# Patient Record
Sex: MALE | Race: BLACK OR AFRICAN AMERICAN | NOT HISPANIC OR LATINO | Employment: STUDENT | URBAN - METROPOLITAN AREA
[De-identification: names, ages, dates, MRNs, and addresses within clinical notes are randomized per-mention and may not be internally consistent; named-entity substitution may affect disease eponyms.]

---

## 2024-02-26 ENCOUNTER — OFFICE VISIT (OUTPATIENT)
Dept: PRIMARY CARE | Facility: HOSPITAL | Age: 34
End: 2024-02-26
Payer: COMMERCIAL

## 2024-02-26 ENCOUNTER — PHARMACY VISIT (OUTPATIENT)
Dept: PHARMACY | Facility: CLINIC | Age: 34
End: 2024-02-26

## 2024-02-26 VITALS
HEART RATE: 87 BPM | TEMPERATURE: 98.2 F | OXYGEN SATURATION: 99 % | DIASTOLIC BLOOD PRESSURE: 68 MMHG | BODY MASS INDEX: 23.3 KG/M2 | SYSTOLIC BLOOD PRESSURE: 111 MMHG | WEIGHT: 131.5 LBS | HEIGHT: 63 IN

## 2024-02-26 DIAGNOSIS — G89.29 CHRONIC MIDLINE THORACIC BACK PAIN: ICD-10-CM

## 2024-02-26 DIAGNOSIS — M54.6 CHRONIC MIDLINE THORACIC BACK PAIN: ICD-10-CM

## 2024-02-26 DIAGNOSIS — Z72.51 RISK FOR SEXUALLY TRANSMITTED DISEASE: ICD-10-CM

## 2024-02-26 DIAGNOSIS — Z00.00 HEALTH MAINTENANCE EXAMINATION: Primary | ICD-10-CM

## 2024-02-26 DIAGNOSIS — R07.9 CHEST PAIN WITH LOW RISK FOR CARDIAC ETIOLOGY: ICD-10-CM

## 2024-02-26 PROBLEM — Z11.3 ROUTINE SCREENING FOR STI (SEXUALLY TRANSMITTED INFECTION): Status: ACTIVE | Noted: 2024-02-26

## 2024-02-26 LAB
ALBUMIN SERPL BCP-MCNC: 4.2 G/DL (ref 3.4–5)
ALP SERPL-CCNC: 42 U/L (ref 33–120)
ALT SERPL W P-5'-P-CCNC: 11 U/L (ref 10–52)
ANION GAP SERPL CALC-SCNC: 11 MMOL/L (ref 10–20)
AST SERPL W P-5'-P-CCNC: 15 U/L (ref 9–39)
BASOPHILS # BLD AUTO: 0.01 X10*3/UL (ref 0–0.1)
BASOPHILS NFR BLD AUTO: 0.2 %
BILIRUB SERPL-MCNC: 0.5 MG/DL (ref 0–1.2)
BUN SERPL-MCNC: 10 MG/DL (ref 6–23)
CALCIUM SERPL-MCNC: 9.4 MG/DL (ref 8.6–10.6)
CHLORIDE SERPL-SCNC: 108 MMOL/L (ref 98–107)
CHOLEST SERPL-MCNC: 170 MG/DL (ref 0–199)
CHOLESTEROL/HDL RATIO: 3.7
CO2 SERPL-SCNC: 29 MMOL/L (ref 21–32)
CREAT SERPL-MCNC: 0.95 MG/DL (ref 0.5–1.3)
EGFRCR SERPLBLD CKD-EPI 2021: >90 ML/MIN/1.73M*2
EOSINOPHIL # BLD AUTO: 0.06 X10*3/UL (ref 0–0.7)
EOSINOPHIL NFR BLD AUTO: 1.5 %
ERYTHROCYTE [DISTWIDTH] IN BLOOD BY AUTOMATED COUNT: 11.9 % (ref 11.5–14.5)
ERYTHROCYTE [SEDIMENTATION RATE] IN BLOOD BY WESTERGREN METHOD: 4 MM/H (ref 0–15)
EST. AVERAGE GLUCOSE BLD GHB EST-MCNC: 74 MG/DL
GLUCOSE SERPL-MCNC: 111 MG/DL (ref 74–99)
HBA1C MFR BLD: 4.2 %
HCT VFR BLD AUTO: 43 % (ref 41–52)
HCV AB SER QL: NONREACTIVE
HDLC SERPL-MCNC: 46.3 MG/DL
HGB BLD-MCNC: 13.9 G/DL (ref 13.5–17.5)
HIV 1+2 AB+HIV1 P24 AG SERPL QL IA: NONREACTIVE
IMM GRANULOCYTES # BLD AUTO: 0.01 X10*3/UL (ref 0–0.7)
IMM GRANULOCYTES NFR BLD AUTO: 0.2 % (ref 0–0.9)
LYMPHOCYTES # BLD AUTO: 1.95 X10*3/UL (ref 1.2–4.8)
LYMPHOCYTES NFR BLD AUTO: 47.7 %
MCH RBC QN AUTO: 29.4 PG (ref 26–34)
MCHC RBC AUTO-ENTMCNC: 32.3 G/DL (ref 32–36)
MCV RBC AUTO: 91 FL (ref 80–100)
MONOCYTES # BLD AUTO: 0.5 X10*3/UL (ref 0.1–1)
MONOCYTES NFR BLD AUTO: 12.2 %
NEUTROPHILS # BLD AUTO: 1.56 X10*3/UL (ref 1.2–7.7)
NEUTROPHILS NFR BLD AUTO: 38.2 %
NON-HDL CHOLESTEROL: 124 MG/DL (ref 0–149)
NRBC BLD-RTO: 0 /100 WBCS (ref 0–0)
PLATELET # BLD AUTO: 200 X10*3/UL (ref 150–450)
POTASSIUM SERPL-SCNC: 3.8 MMOL/L (ref 3.5–5.3)
PROT SERPL-MCNC: 6.6 G/DL (ref 6.4–8.2)
RBC # BLD AUTO: 4.73 X10*6/UL (ref 4.5–5.9)
SODIUM SERPL-SCNC: 144 MMOL/L (ref 136–145)
TREPONEMA PALLIDUM IGG+IGM AB [PRESENCE] IN SERUM OR PLASMA BY IMMUNOASSAY: NONREACTIVE
WBC # BLD AUTO: 4.1 X10*3/UL (ref 4.4–11.3)

## 2024-02-26 PROCEDURE — 1036F TOBACCO NON-USER: CPT

## 2024-02-26 PROCEDURE — 99213 OFFICE O/P EST LOW 20 MIN: CPT | Mod: GC

## 2024-02-26 PROCEDURE — 86780 TREPONEMA PALLIDUM: CPT

## 2024-02-26 PROCEDURE — 87389 HIV-1 AG W/HIV-1&-2 AB AG IA: CPT

## 2024-02-26 PROCEDURE — 85025 COMPLETE CBC W/AUTO DIFF WBC: CPT

## 2024-02-26 PROCEDURE — 86803 HEPATITIS C AB TEST: CPT

## 2024-02-26 PROCEDURE — 83036 HEMOGLOBIN GLYCOSYLATED A1C: CPT

## 2024-02-26 PROCEDURE — 83718 ASSAY OF LIPOPROTEIN: CPT

## 2024-02-26 PROCEDURE — 99203 OFFICE O/P NEW LOW 30 MIN: CPT

## 2024-02-26 PROCEDURE — 36415 COLL VENOUS BLD VENIPUNCTURE: CPT

## 2024-02-26 PROCEDURE — 84075 ASSAY ALKALINE PHOSPHATASE: CPT

## 2024-02-26 PROCEDURE — 87800 DETECT AGNT MULT DNA DIREC: CPT

## 2024-02-26 PROCEDURE — 85652 RBC SED RATE AUTOMATED: CPT

## 2024-02-26 PROCEDURE — RXMED WILLOW AMBULATORY MEDICATION CHARGE

## 2024-02-26 PROCEDURE — 87661 TRICHOMONAS VAGINALIS AMPLIF: CPT | Mod: 59

## 2024-02-26 RX ORDER — ACETAMINOPHEN 325 MG/1
650 TABLET ORAL EVERY 6 HOURS PRN
Qty: 60 TABLET | Refills: 0 | Status: SHIPPED | OUTPATIENT
Start: 2024-02-26 | End: 2024-03-11

## 2024-02-26 RX ORDER — IBUPROFEN 600 MG/1
600 TABLET ORAL 4 TIMES DAILY PRN
Qty: 40 TABLET | Refills: 0 | Status: SHIPPED | OUTPATIENT
Start: 2024-02-26 | End: 2024-03-07

## 2024-02-26 ASSESSMENT — ENCOUNTER SYMPTOMS
DEPRESSION: 0
LOSS OF SENSATION IN FEET: 0
OCCASIONAL FEELINGS OF UNSTEADINESS: 0

## 2024-02-26 ASSESSMENT — PATIENT HEALTH QUESTIONNAIRE - PHQ9
2. FEELING DOWN, DEPRESSED OR HOPELESS: SEVERAL DAYS
SUM OF ALL RESPONSES TO PHQ9 QUESTIONS 1 AND 2: 2
1. LITTLE INTEREST OR PLEASURE IN DOING THINGS: SEVERAL DAYS

## 2024-02-26 ASSESSMENT — PAIN SCALES - GENERAL: PAINLEVEL: 4

## 2024-02-26 NOTE — PROGRESS NOTES
HPI     Lewis Hernandez is 33 y.o. a male with no PMH who is presenting as new patient for lower back and chest pain.    Per the patient: Has been having chest pain and lower back pain for the past 6 months. Has sharp pain in the left lower chest that comes and goes. For the back will have pain associated with movement, bending forward and backward.     Regarding chest pain, denies it currently. Hasn't had in a few days. Notices it when sitting and studying, has never occurred while exercising. Characterizes as sharp pain, 5-6 in severity and lasts 1-2 minutes. Usually doesn't happen more than once per day. Pain improves when he sits up or stretches.     Regarding back pain, has been ongoing for 6 months, currently 5/10, sharp, sometimes doesn't really feel until he moves. Notices it is worst in the morning after getting out of bed, then eases up throughout the day. Doesn't radiate anywhere. Also denies numbness/tingling. Hasn't tried any medicine to make it better recently. Previously went to hospital in [facility] when pain was very intense, received tramadol which helped. Took the medicine and didn't have pain for about a week, this was about a year ago. The pain let up after that but is now back again.    ROS:  GENERAL.: Denies weight change, N/V/F/C, trouble sleeping.  EYES: Denies vision lose, double vision, blurry vision.   ENT: Denies sore throat, runny nose, congestion, trouble swallowing.  CARDIO: Denies chest pain, palpitations, orthopnea, PND.  PULMONARY: Denies shortness of breath, cough.  GI: Denies abdominal pain, constipation, diarrhea, bloody or black stools.  : Denies frequency, urgency, dysuria, hematuria.  MS: Denies limb pain, joint pain.  SKIN: Denies rashes.  PSYCH: Denies change in mood.  Review of systems is otherwise negative unless stated above or in history of present illness    Meds:  Not taking any meds, no vitamins or supplements.    Allergies: None  Preferred pharmacy: [facility]  "refills? No    PMH: Denies  PSH: None  FH: Mom with HTN.  Social:  Living situation: Lives in a house, lives with 3 room mates. 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 Originally from [place], has been in states since [place] 2023.  Alcohol: Denies  Tobacco: Denies  Other drugs: Denies  Sexually active?: Not currently, previously with women. Would be interested in testing.    Health Maintenance Due   Topic Date Due    Yearly Adult Physical  Never done    Hepatitis B Vaccines (1 of 3 - 3-dose series) Never done    Lipid Panel  Never done    HIV Screening  Never done    COVID-19 Vaccine (1) Never done    MMR Vaccines (1 of 1 - Standard series) Never done    Varicella Vaccines (1 of 2 - 2-dose childhood series) Never done    Hepatitis C Screening  Never done    DTaP/Tdap/Td Vaccines (1 - Tdap) Never done    Influenza Vaccine (1) Never done        Tobacco Use: Low Risk  (2/26/2024)    Patient History     Smoking Tobacco Use: Never     Smokeless Tobacco Use: Never     Passive Exposure: Not on file      No Known Allergies   Physical Exam     Visit Vitals  /68 (BP Location: Left arm, Patient Position: Sitting, BP Cuff Size: Adult)   Pulse 87   Temp 36.8 °C (98.2 °F) (Temporal)   Ht 1.6 m (5' 3\")   Wt 59.6 kg (131 lb 8 oz)   SpO2 99%   BMI 23.29 kg/m²   Smoking Status Never   BSA 1.63 m²      Exam:  GENERAL.: Vitals noted, no distress.   HEENT: Normocephalic, atraumatic, MMM.  EYES: PERRL, EOMI. Normal conjunctiva. No scleral icterus  NECK/SPINE: Supple, FROM, TTP over SP from thoracic to lumbar spine  CV: Regular rate rhythm. No murmurs appreciated. Intact radial pulses.  LUNGS: Clear to auscultation bilaterally. Symmetric chest rise. No wheezes, rales, or rhonchi  ABDOMEN: Soft, nontender. No distention.  EXTREMITIES: No edema. FROM  SKIN: No rash  NEURO: No focal neurologic deficits. CN II-XII grossly intact.  PSYCH: Appropriate mood and affect    Medications     Current Outpatient Medications " "  Medication Instructions    acetaminophen (TYLENOL) 650 mg, oral, Every 6 hours PRN    ibuprofen 600 mg, oral, 4 times daily PRN        Recent Labs     No results found for: \"HGBA1C\", \"CHOL\", \"LDLF\", \"HDL\", \"AST\", \"ALT\", \"BILITOT\"     No results found for: \"WBC\", \"HGB\", \"HCT\", \"MCV\", \"PLT\"       Chemistry    No results found for: \"NA\", \"K\", \"CL\", \"CO2\", \"BUN\", \"CREATININE\", \"GLU\" No results found for: \"CALCIUM\", \"ALKPHOS\", \"AST\", \"ALT\", \"BILITOT\"          Assessment/Plan      Problem List Items Addressed This Visit          Cardiac and Vasculature    Chest pain with low risk for cardiac etiology       Health Encounters    Health maintenance examination - Primary    Relevant Orders    CBC and Auto Differential    Comprehensive metabolic panel    Hemoglobin A1c    HIV 1/2 Antigen/Antibody Screen with Reflex to Confirmation    Hepatitis C antibody    Syphilis Screen with Reflex    Lipid Panel Non-Fasting       Infectious Diseases    Risk for sexually transmitted disease    Relevant Orders    HIV 1/2 Antigen/Antibody Screen with Reflex to Confirmation    Hepatitis C antibody    C. trachomatis / N. gonorrhoeae, DNA probe    Syphilis Screen with Reflex    Trichomonas vaginalis, Amplified       Musculoskeletal and Injuries    Chronic midline thoracic back pain    Relevant Medications    acetaminophen (Tylenol) 325 mg tablet    ibuprofen 600 mg tablet    Other Relevant Orders    CBC and Auto Differential    Comprehensive metabolic panel    Sedimentation rate, automated    XR thoracic spine complete 4+ views    XR lumbar spine 2-3 views      Lewis Hernandez is a 32 y/o M with no PMH who is presenting as new patient for lower back and chest pain.    #Chest pain  -Positional, occurs at rest, lasts 1-2 minutes, improved with stretching  -Low concern for cardiac etiology, likely MSK    #Back pain  -SP TTP on exam, over thoracic and lumbar spine  -Ongoing issue for 6+ months  -Managed in [facility] with pain meds, improved but " reoccurred    Plan:  -Will start acetaminophen 650 Q6 PRN and ibuprofen 600 Q6 PRN for pain  -Thoracic and lumbar x-rays ordered  -If workup abnormal, would favor MRI as next diagnostic step if needed  -If pain persists, will consider PT referral at next visit  -labs including CBC, CMP, ESR    #C/f STI exposure  -pt endorses he is not currently sexually active but has been previously, would like STI testing    Plan:  -Will send workup including HIV, G/C, syphilis, trich    #Health Maintenance  #Immunizations: Received childhood series received in [facility], might have records though is unsure if he can access them? Vaccinated outside [place] Yes? Per [employer] no information on file. No flu shot this year.   #Screening:  -Cardiovascular: No lipid panel on file  -Diabetes: No HBA1C on file  -Cancer: Colorectal (Not indicated), Lung (Not indicated)  -Infectious Disease: No HepC on file, No HIV on file.  -Osteoporosis: Not indicated  Behavioral Counseling: Tobacco/smoking (Currently not smoking), alcohol (Denies etoh), safety (no safety concerns), diet/exercise (currently active)    Plan:  -Labs including HBA1C, Lipid panel, CBC, CMP    RTC 1 month to follow up on back pain    Pt staffed with attending Dr. Anyi Hassan MD PhD

## 2024-02-26 NOTE — PROGRESS NOTES
I saw and evaluated the patient. I personally obtained the key and critical portions of the history and physical exam or was physically present for key and critical portions performed by the resident/fellow. I reviewed the resident/fellow's documentation and discussed the patient with the resident/fellow. I agree with the resident/fellow's medical decision making as documented in the note.    Randy De Santiago MD

## 2024-02-26 NOTE — PATIENT INSTRUCTIONS
It was very nice to see you in the clinic today. These are the things we talked about today.  For your chest pain, it is unlikely that this is caused by a problem with your heart. This problem is more likely related to the muscles in your chest getting irritated while you are sitting.  For your back pain, this is also possibly related to the muscles in your back, but since this has been going on for so long I would like to get a set of X-rays to look at the bones in your spine.  I also sent a prescription for acetaminophen and ibuprofen for you to take to help with the back pain.  We are also checking lab work, we will call you with the results of these tests.  Please return to the clinic in 1 month to check up on your back pain.    If you have any questions, would like to see a doctor, or need refills on your medications please call our office at [phone].

## 2024-02-27 LAB
C TRACH RRNA SPEC QL NAA+PROBE: POSITIVE
N GONORRHOEA DNA SPEC QL PROBE+SIG AMP: NEGATIVE
T VAGINALIS RRNA SPEC QL NAA+PROBE: NEGATIVE

## 2024-02-28 ENCOUNTER — TELEPHONE (OUTPATIENT)
Dept: INTERNAL MEDICINE | Facility: HOSPITAL | Age: 34
End: 2024-02-28
Payer: COMMERCIAL

## 2024-02-28 DIAGNOSIS — A74.9 CHLAMYDIA INFECTION: Primary | ICD-10-CM

## 2024-02-28 PROCEDURE — RXMED WILLOW AMBULATORY MEDICATION CHARGE

## 2024-02-28 RX ORDER — DOXYCYCLINE 100 MG/1
100 CAPSULE ORAL 2 TIMES DAILY
Qty: 14 CAPSULE | Refills: 0 | Status: SHIPPED | OUTPATIENT
Start: 2024-02-28 | End: 2024-03-07

## 2024-02-28 NOTE — TELEPHONE ENCOUNTER
Called patient to discuss test results, including positive Chlamydia trachomatis PCR. Sent script for 7 day course of doxycycline to [facility] pharmacy and discussed importance of completing the course of antibiotics, as well as avoiding sexual activity until the course was completed, and the importance of notifying previous partners of the need to be tested and treated. He was agreeable to this plan. We also discussed other lab work results, all questions were answered.

## 2024-02-29 ENCOUNTER — PHARMACY VISIT (OUTPATIENT)
Dept: PHARMACY | Facility: CLINIC | Age: 34
End: 2024-02-29
Payer: COMMERCIAL

## 2024-03-28 ENCOUNTER — APPOINTMENT (OUTPATIENT)
Dept: PRIMARY CARE | Facility: HOSPITAL | Age: 34
End: 2024-03-28
Payer: COMMERCIAL